# Patient Record
Sex: MALE | Race: WHITE | NOT HISPANIC OR LATINO | URBAN - METROPOLITAN AREA
[De-identification: names, ages, dates, MRNs, and addresses within clinical notes are randomized per-mention and may not be internally consistent; named-entity substitution may affect disease eponyms.]

---

## 2021-04-06 DIAGNOSIS — Z23 ENCOUNTER FOR IMMUNIZATION: ICD-10-CM

## 2021-04-09 ENCOUNTER — IMMUNIZATIONS (OUTPATIENT)
Dept: FAMILY MEDICINE CLINIC | Facility: HOSPITAL | Age: 31
End: 2021-04-09

## 2021-04-09 DIAGNOSIS — Z23 ENCOUNTER FOR IMMUNIZATION: ICD-10-CM

## 2022-05-04 ENCOUNTER — OFFICE VISIT (OUTPATIENT)
Dept: INTERNAL MEDICINE CLINIC | Facility: CLINIC | Age: 32
End: 2022-05-04
Payer: COMMERCIAL

## 2022-05-04 ENCOUNTER — TELEPHONE (OUTPATIENT)
Dept: PSYCHIATRY | Facility: CLINIC | Age: 32
End: 2022-05-04

## 2022-05-04 VITALS
HEART RATE: 55 BPM | DIASTOLIC BLOOD PRESSURE: 72 MMHG | WEIGHT: 200.6 LBS | HEIGHT: 69 IN | SYSTOLIC BLOOD PRESSURE: 112 MMHG | TEMPERATURE: 97 F | BODY MASS INDEX: 29.71 KG/M2 | OXYGEN SATURATION: 99 %

## 2022-05-04 DIAGNOSIS — Z30.2 ENCOUNTER FOR VASECTOMY: ICD-10-CM

## 2022-05-04 DIAGNOSIS — Z13.1 SCREENING FOR DIABETES MELLITUS: ICD-10-CM

## 2022-05-04 DIAGNOSIS — J30.9 ALLERGIC RHINITIS, UNSPECIFIED SEASONALITY, UNSPECIFIED TRIGGER: ICD-10-CM

## 2022-05-04 DIAGNOSIS — F34.1 DYSTHYMIA: Primary | ICD-10-CM

## 2022-05-04 DIAGNOSIS — R41.840 INATTENTION: ICD-10-CM

## 2022-05-04 DIAGNOSIS — Z76.89 ENCOUNTER TO ESTABLISH CARE: ICD-10-CM

## 2022-05-04 DIAGNOSIS — Z13.220 SCREENING FOR LIPID DISORDERS: ICD-10-CM

## 2022-05-04 DIAGNOSIS — Z30.09 VASECTOMY EVALUATION: ICD-10-CM

## 2022-05-04 DIAGNOSIS — F33.9 DEPRESSION, RECURRENT (HCC): ICD-10-CM

## 2022-05-04 DIAGNOSIS — Z13.0 SCREENING FOR DEFICIENCY ANEMIA: ICD-10-CM

## 2022-05-04 PROBLEM — M54.50 LOW BACK PAIN: Status: RESOLVED | Noted: 2022-05-04 | Resolved: 2022-05-04

## 2022-05-04 PROBLEM — F32.A DEPRESSION: Status: ACTIVE | Noted: 2022-05-04

## 2022-05-04 PROBLEM — R09.1 PLEURISY: Status: RESOLVED | Noted: 2022-05-04 | Resolved: 2022-05-04

## 2022-05-04 PROBLEM — R09.1 PLEURISY: Status: ACTIVE | Noted: 2022-05-04

## 2022-05-04 PROBLEM — M54.50 LOW BACK PAIN: Status: ACTIVE | Noted: 2022-05-04

## 2022-05-04 PROCEDURE — 3725F SCREEN DEPRESSION PERFORMED: CPT | Performed by: NURSE PRACTITIONER

## 2022-05-04 PROCEDURE — 99204 OFFICE O/P NEW MOD 45 MIN: CPT | Performed by: NURSE PRACTITIONER

## 2022-05-04 PROCEDURE — 3008F BODY MASS INDEX DOCD: CPT | Performed by: NURSE PRACTITIONER

## 2022-05-04 RX ORDER — BUPROPION HYDROCHLORIDE 150 MG/1
150 TABLET ORAL EVERY MORNING
Qty: 30 TABLET | Refills: 1 | Status: SHIPPED | OUTPATIENT
Start: 2022-05-04 | End: 2022-06-08

## 2022-05-04 RX ORDER — CETIRIZINE HYDROCHLORIDE 10 MG/1
10 TABLET ORAL DAILY
COMMUNITY
Start: 2019-07-06

## 2022-05-04 NOTE — PROGRESS NOTES
Assessment/Plan:    Problem List Items Addressed This Visit        Respiratory    Allergic rhinitis     - Claritin daily             Other    Depression, recurrent ([address] ) - Primary     - start wellbutrin XL 150mg daily   - follow up in 4 weeks          Relevant Medications    buPROPion (Wellbutrin XL) 150 mg 24 hr tablet    Inattention    Relevant Orders    TSH, 3rd generation with Free T4 reflex    Ambulatory Referral to Psychiatry    Vasectomy evaluation    Relevant Orders    Ambulatory Referral to Urology    RESOLVED: Encounter for vasectomy      Other Visit Diagnoses     Screening for lipid disorders        Relevant Orders    Lipid Panel with Direct LDL reflex    Screening for diabetes mellitus        Relevant Orders    Comprehensive metabolic panel    Screening for deficiency anemia        Relevant Orders    CBC and differential    Encounter to establish care        medical history reviewed with patient in detail         BMI Counseling: Body mass index is 29 44 kg/m²  Discussed the patient's BMI with him  The BMI is above normal  Nutrition recommendations include 3-5 servings of fruits/vegetables daily, moderation in carbohydrate intake, increasing intake of lean protein and reducing intake of saturated fat and trans fat  Exercise recommendations include moderate aerobic physical activity for 150 minutes/week  M*Modal software was used to dictate this note  It may contain errors with dictating incorrect words or incorrect spelling  Please contact the provider directly with any questions  Subjective:      Patient ID: Leelee Hudson is a 32 y o  male  HPI     Patient presents today to establish care with our office  He was previously following with a private practice Dr Faria Simple  Medical conditions include:     Depression - he was suspected to have depression approx 10 years ago  He did see a therapist for about 9 months prior to the pandemic  He has never been on medication   He has not seen his counselor in 2 years  He did find some benefit in seeing the counselor  PHQ-2/9 Depression Screening    Little interest or pleasure in doing things: 1 - several days  Feeling down, depressed, or hopeless: 1 - several days  Trouble falling or staying asleep, or sleeping too much: 0 - not at all  Feeling tired or having little energy: 3 - nearly every day  Poor appetite or overeatin - more than half the days  Feeling bad about yourself - or that you are a failure or have let yourself or your family down: 0 - not at all  Trouble concentrating on things, such as reading the newspaper or watching television: 2 - more than half the days  Moving or speaking so slowly that other people could have noticed  Or the opposite - being so fidgety or restless that you have been moving around a lot more than usual: 0 - not at all  Thoughts that you would be better off dead, or of hurting yourself in some way: 0 - not at all  PHQ-2 Score: 2  PHQ-2 Interpretation: Negative depression screen  PHQ-9 Score: 9   PHQ-9 Interpretation: Mild depression      Anxiety - he has some situational anxiety which he finds he deals with mostly through avoidance of the things that cause his symptoms  ADHD - he is concerned for possible ADHD  His son was recently diagnosed and he feels that his symptoms mimic the same as his sons  In school, he was able to get good grades but did not do his homework  He had trouble paying attention if he was bored  He works at a [employer] plant  He does well at his job  He is able to focus well and complete his tasks without issues  He has trouble paying attention to conversations       The following portions of the patient's history were reviewed and updated as appropriate: allergies, current medications, past family history, past medical history, past social history, past surgical history and problem list     Review of Systems   Constitutional: Positive for unexpected weight change (weight has fluctuated from 179lb, 215lbs now 200lbs  )  Negative for activity change, appetite change, chills, diaphoresis, fatigue and fever  Respiratory: Negative for cough, chest tightness, shortness of breath and wheezing  Cardiovascular: Negative for chest pain  Hx of intermittent chest tightness and pain a long time ago, unable to recall any events   Gastrointestinal: Negative for abdominal distention, abdominal pain, blood in stool, constipation, diarrhea, nausea and vomiting  Neurological: Positive for headaches (intermittent, controlled with IBUprofen)  Negative for dizziness and light-headedness  Psychiatric/Behavioral: Positive for decreased concentration and dysphoric mood  Negative for self-injury, sleep disturbance and suicidal ideas  The patient is nervous/anxious  Past Medical History:   Diagnosis Date    Depression          Current Outpatient Medications:     cetirizine (ZyrTEC Allergy) 10 mg tablet, Take 10 mg by mouth daily  , Disp: , Rfl:     buPROPion (Wellbutrin XL) 150 mg 24 hr tablet, Take 1 tablet (150 mg total) by mouth every morning, Disp: 30 tablet, Rfl: 1    Allergies   Allergen Reactions    Pollen Extract Other (See Comments)     Nasal congestion, itchy eyes       Social History   Past Surgical History:   Procedure Laterality Date    TOE SURGERY  04/2018    1st right toe due to infection     Family History   Problem Relation Age of Onset    Diabetes Mother     Anxiety disorder Mother         undiagnosed ADHD    Diabetes Father         retinopathy    Diabetes Maternal Grandfather     Lung cancer Paternal Grandmother         smoker       Objective:  /72 (BP Location: Left arm, Patient Position: Sitting, Cuff Size: Standard)   Pulse 55   Temp (!) 97 °F (36 1 °C) (Tympanic)   Ht 5' 9 21" (1 758 m)   Wt 91 kg (200 lb 9 6 oz)   SpO2 99%   BMI 29 44 kg/m²      Physical Exam  Vitals reviewed  Constitutional:       General: He is not in acute distress  Appearance: Normal appearance  He is well-developed  He is not diaphoretic  HENT:      Head: Normocephalic and atraumatic  Right Ear: Tympanic membrane and external ear normal       Left Ear: Tympanic membrane and external ear normal       Nose: Nose normal       Mouth/Throat:      Mouth: Mucous membranes are moist       Pharynx: Oropharynx is clear  No oropharyngeal exudate or posterior oropharyngeal erythema  Eyes:      Extraocular Movements: Extraocular movements intact  Conjunctiva/sclera: Conjunctivae normal       Pupils: Pupils are equal, round, and reactive to light  Neck:      Vascular: No carotid bruit  Cardiovascular:      Rate and Rhythm: Normal rate and regular rhythm  Heart sounds: Normal heart sounds  No murmur heard  Pulmonary:      Effort: Pulmonary effort is normal  No respiratory distress  Breath sounds: Normal breath sounds  No decreased breath sounds, wheezing, rhonchi or rales  Musculoskeletal:      Cervical back: Neck supple  Lymphadenopathy:      Cervical: No cervical adenopathy  Skin:     General: Skin is warm and dry  Neurological:      Mental Status: He is alert and oriented to person, place, and time  Mental status is at baseline  Motor: No weakness  Gait: Gait normal    Psychiatric:         Mood and Affect: Mood normal          Behavior: Behavior normal          Thought Content:  Thought content normal          Judgment: Judgment normal

## 2022-05-31 ENCOUNTER — TELEPHONE (OUTPATIENT)
Dept: PSYCHIATRY | Facility: CLINIC | Age: 32
End: 2022-05-31

## 2022-05-31 NOTE — TELEPHONE ENCOUNTER
Patient has a referral in system  Called and spoke with patient  Patient has been added to the wait list for med mgmt

## 2022-06-01 ENCOUNTER — RA CDI HCC (OUTPATIENT)
Dept: OTHER | Facility: HOSPITAL | Age: 32
End: 2022-06-01

## 2022-06-01 NOTE — PROGRESS NOTES
[facility]  coding opportunities       Chart reviewed, no opportunity found: CHART REVIEWED, NO OPPORTUNITY FOUND        Patients Insurance        Commercial Insurance: [employer]

## 2022-06-02 ENCOUNTER — TELEPHONE (OUTPATIENT)
Dept: PSYCHIATRY | Facility: CLINIC | Age: 32
End: 2022-06-02

## 2022-06-02 NOTE — TELEPHONE ENCOUNTER
Behavorial Health Outpatient Intake Questions    Referred by: PCP    Please advised interviewee that they need to answer all questions truthfully to allow for best care and any misrepresentations of information may affect their ability to be seen at this clinic   => Was this discussed? Yes     BehavGeneral acute hospital Health Outpatient Intake History -     Presenting Problem (in patient's words):   ADHA     Are there any developmental disabilities? ? If yes, can they speak to you on the phone? If they are too limited to speak to you on phone, refer out No    Are you taking any psychiatric medications? Yes    => If yes, who prescribes? If yes, are they injectable medications? adderall =PCP      Does the patient have a language barrier or hearing impairment? No    Have you been treated at [facility] by a therapist or a doctor in the past? If yes, who? No    Has the patient been hospitalized for mental health? No   If yes, how long ago was last hospitalization and where was it? Do you actively use alcohol or marijuana or illegal substances? If yes, what and how much - refer out to Drug and alcohol treatment if use is excessive or daily use of illegal substances No concerns of substance abuse are reported  Do you have a community treatment team or ? No    Legal History-     Does the patient have any history of arrests, correction/detention time, or DUIs? No  If Yes-  1) What types of charges? 2) When were they last incarcerated? 3) Are they currently on parole or probation? Minor Child-    Who has custody of the child? Is there a custody agreement? If there is a custody agreement remind parent that they must bring a copy to the first appt or they will not be seen       Intake Team, please check with provider before scheduling if flags come up such as:  - complex case  - legal history (other than DUI)  - communication barrier concerns are present  - if, in your judgment, this needs further review    ACCEPTED as a patient Yes  => Appointment Date: 06/10/2022 w/ Josefa     Referred Elsewhere? No    Name of Insurance Co:[address]#[id number]  Insurance Phone #  If ins is primary or secondary  If patient is a minor, parents information such as Name, D  O B of guarantor

## 2022-06-08 ENCOUNTER — OFFICE VISIT (OUTPATIENT)
Dept: INTERNAL MEDICINE CLINIC | Facility: CLINIC | Age: 32
End: 2022-06-08
Payer: COMMERCIAL

## 2022-06-08 VITALS
TEMPERATURE: 97.5 F | SYSTOLIC BLOOD PRESSURE: 140 MMHG | DIASTOLIC BLOOD PRESSURE: 90 MMHG | WEIGHT: 194 LBS | OXYGEN SATURATION: 98 % | BODY MASS INDEX: 24.9 KG/M2 | HEART RATE: 79 BPM | HEIGHT: 74 IN

## 2022-06-08 DIAGNOSIS — R74.8 ELEVATED LIVER ENZYMES: ICD-10-CM

## 2022-06-08 DIAGNOSIS — F90.2 ATTENTION DEFICIT HYPERACTIVITY DISORDER (ADHD), COMBINED TYPE: ICD-10-CM

## 2022-06-08 DIAGNOSIS — R73.01 IMPAIRED FASTING GLUCOSE: ICD-10-CM

## 2022-06-08 DIAGNOSIS — E83.52 HYPERCALCEMIA: Primary | ICD-10-CM

## 2022-06-08 DIAGNOSIS — F33.9 DEPRESSION, RECURRENT (HCC): ICD-10-CM

## 2022-06-08 DIAGNOSIS — R03.0 ELEVATED BP WITHOUT DIAGNOSIS OF HYPERTENSION: ICD-10-CM

## 2022-06-08 PROBLEM — R41.840 INATTENTION: Status: RESOLVED | Noted: 2022-05-04 | Resolved: 2022-06-08

## 2022-06-08 PROCEDURE — 93000 ELECTROCARDIOGRAM COMPLETE: CPT | Performed by: NURSE PRACTITIONER

## 2022-06-08 PROCEDURE — 99214 OFFICE O/P EST MOD 30 MIN: CPT | Performed by: NURSE PRACTITIONER

## 2022-06-08 RX ORDER — DEXTROAMPHETAMINE SACCHARATE, AMPHETAMINE ASPARTATE MONOHYDRATE, DEXTROAMPHETAMINE SULFATE AND AMPHETAMINE SULFATE 7.5; 7.5; 7.5; 7.5 MG/1; MG/1; MG/1; MG/1
30 CAPSULE, EXTENDED RELEASE ORAL DAILY
COMMUNITY
Start: 2022-06-02 | End: 2022-06-10 | Stop reason: SDUPTHER

## 2022-06-08 NOTE — PROGRESS NOTES
Assessment/Plan:    Problem List Items Addressed This Visit        Endocrine    Impaired fasting glucose    Relevant Orders    Comprehensive metabolic panel    Hemoglobin A1C       Other    Depression, recurrent (HCC)     - now following with psychiatry            Relevant Medications    amphetamine-dextroamphetamine (ADDERALL XR) 30 MG 24 hr capsule    Hypercalcemia - Primary     - repeat in 1 month  - check PTH            Relevant Orders    Comprehensive metabolic panel    PTH, intact    Elevated liver enzymes     - repeat in 1 month, no prior labs for comparison   - if level continue to elevated will check liver US            Relevant Orders    Comprehensive metabolic panel    Chronic Hepatitis Panel    Gamma GT    Elevated BP without diagnosis of hypertension     - suspect his elevated BP is secondary to his Adderall XR 30mg  He has no hx of HTN until starting this medication  - asymptomatic  - EKG sinus rhythm with PACs   - advised to follow up with psychiatrist to discuss alternative medication  - recommend monitoring BP at home            Relevant Orders    POCT ECG (Completed)    UA w Reflex to Microscopic w Reflex to Culture -Lab Collect    Attention deficit hyperactivity disorder (ADHD), combined type     - managed by psychiatry  - recently started on Adderall XR 30mg daily  - advised to follow up to discuss alternative options due to HTN on Adderall            Relevant Medications    amphetamine-dextroamphetamine (ADDERALL XR) 30 MG 24 hr capsule          M*Modal software was used to dictate this note  It may contain errors with dictating incorrect words or incorrect spelling  Please contact the provider directly with any questions  Subjective:      Patient ID: Jairo Kowalski is a 32 y o  male  HPI    Patient presents today for 1 month follow up  After our last visit he was able to see a virtual psychiatrist outside of [place]  He was evaluated and diagnosed with ADHD combined type   He was started on Adderall XR 20mg daily and then increased last week to Adderall XR 30mg daily  He did try the wellbutrin 150mg daily for 1 week and then was advised to stop by his psychiatrist      Since starting Adderall he is able to maintain and finish tasks that he does not enjoy doing  He is able to stay on track more easily  He has more iniative, he is more proactive and attentive around his kids and family  Labs completed 6/2  CBC normal  Elevated ,   elevated AST 48, elevated ALT 90  Elevated calcium 10 5    Total cholesterol 182, triglycerides 167, HDL 37,       The following portions of the patient's history were reviewed and updated as appropriate: allergies, current medications, past family history, past medical history, past social history, past surgical history and problem list     Review of Systems   Constitutional: Negative for appetite change, chills, fever and unexpected weight change  Respiratory: Negative for cough, chest tightness, shortness of breath and wheezing  Cardiovascular: Negative for chest pain and palpitations  Neurological: Negative for headaches  Psychiatric/Behavioral: Positive for decreased concentration and dysphoric mood           Past Medical History:   Diagnosis Date    Depression          Current Outpatient Medications:     amphetamine-dextroamphetamine (ADDERALL XR) 30 MG 24 hr capsule, Take 30 mg by mouth daily, Disp: , Rfl:     cetirizine (ZyrTEC) 10 mg tablet, Take 10 mg by mouth daily  , Disp: , Rfl:     Allergies   Allergen Reactions    Pollen Extract Other (See Comments)     Nasal congestion, itchy eyes       Social History   Past Surgical History:   Procedure Laterality Date    TOE SURGERY  04/2018    1st right toe due to infection     Family History   Problem Relation Age of Onset    Diabetes Mother     Anxiety disorder Mother         undiagnosed ADHD    Diabetes Father         retinopathy    Diabetes Maternal Grandfather     Lung cancer Paternal Grandmother         smoker       Objective:  /90 (BP Location: Left arm, Patient Position: Sitting, Cuff Size: Standard)   Pulse 79   Temp 97 5 °F (36 4 °C) (Temporal)   Ht 6' 2 02" (1 88 m)   Wt 88 kg (194 lb)   SpO2 98%   BMI 24 90 kg/m²      Physical Exam  Vitals reviewed  Constitutional:       General: He is not in acute distress  Appearance: Normal appearance  He is not diaphoretic  HENT:      Head: Normocephalic and atraumatic  Eyes:      Extraocular Movements: Extraocular movements intact  Conjunctiva/sclera: Conjunctivae normal       Pupils: Pupils are equal, round, and reactive to light  Cardiovascular:      Rate and Rhythm: Normal rate and regular rhythm  Heart sounds: Normal heart sounds  No murmur heard  Pulmonary:      Effort: Pulmonary effort is normal  No respiratory distress  Breath sounds: Normal breath sounds  No wheezing, rhonchi or rales  Neurological:      Mental Status: He is alert and oriented to person, place, and time  Mental status is at baseline     Psychiatric:         Mood and Affect: Mood normal          Behavior: Behavior normal

## 2022-06-08 NOTE — ASSESSMENT & PLAN NOTE
- managed by psychiatry  - recently started on Adderall XR 30mg daily  - advised to follow up to discuss alternative options due to HTN on Adderall

## 2022-06-08 NOTE — ASSESSMENT & PLAN NOTE
- suspect his elevated BP is secondary to his Adderall XR 30mg   He has no hx of HTN until starting this medication  - asymptomatic  - EKG sinus rhythm with PACs   - advised to follow up with psychiatrist to discuss alternative medication  - recommend monitoring BP at home

## 2022-06-08 NOTE — ASSESSMENT & PLAN NOTE
- repeat in 1 month, no prior labs for comparison   - if level continue to elevated will check liver US

## 2022-06-10 ENCOUNTER — OFFICE VISIT (OUTPATIENT)
Dept: PSYCHIATRY | Facility: CLINIC | Age: 32
End: 2022-06-10
Payer: COMMERCIAL

## 2022-06-10 VITALS — WEIGHT: 191.3 LBS | BODY MASS INDEX: 28.33 KG/M2 | HEIGHT: 69 IN

## 2022-06-10 DIAGNOSIS — F34.1 DYSTHYMIA: ICD-10-CM

## 2022-06-10 DIAGNOSIS — F41.0 PANIC ATTACKS: ICD-10-CM

## 2022-06-10 DIAGNOSIS — F90.2 ATTENTION DEFICIT HYPERACTIVITY DISORDER (ADHD), COMBINED TYPE: Primary | ICD-10-CM

## 2022-06-10 DIAGNOSIS — R41.840 INATTENTION: ICD-10-CM

## 2022-06-10 DIAGNOSIS — F32.1 MODERATE MAJOR DEPRESSION (HCC): ICD-10-CM

## 2022-06-10 DIAGNOSIS — F41.1 GENERALIZED ANXIETY DISORDER: ICD-10-CM

## 2022-06-10 PROCEDURE — 3008F BODY MASS INDEX DOCD: CPT | Performed by: NURSE PRACTITIONER

## 2022-06-10 PROCEDURE — 90792 PSYCH DIAG EVAL W/MED SRVCS: CPT | Performed by: PHYSICIAN ASSISTANT

## 2022-06-10 RX ORDER — DEXTROAMPHETAMINE SACCHARATE, AMPHETAMINE ASPARTATE MONOHYDRATE, DEXTROAMPHETAMINE SULFATE AND AMPHETAMINE SULFATE 7.5; 7.5; 7.5; 7.5 MG/1; MG/1; MG/1; MG/1
30 CAPSULE, EXTENDED RELEASE ORAL DAILY
Qty: 30 CAPSULE | Refills: 0 | Status: SHIPPED | OUTPATIENT
Start: 2022-06-10 | End: 2022-07-08 | Stop reason: SDUPTHER

## 2022-06-10 NOTE — BH TREATMENT PLAN
TREATMENT PLAN (Medication Management Only)        [facility]    Name/Date of Birth/MRN:  Leelee Hudson 31 y o  1990 MRN: [id number]  Date of Treatment Plan: Nereyda 10, 2022  Diagnosis/Diagnoses:   1  Attention deficit hyperactivity disorder (ADHD), combined type    2  Generalized anxiety disorder    3  Moderate major depression ([address] )    4  Dysthymia    5  Panic attacks    6  Inattention      Strengths/Personal Resources for Self-Care: "Being good at finding the details; At times, I have impressive recollection for certain facts and trivia "  Area/Areas of need (in own words): "Largely, somebody else confirming that --yes, you have this going on" and to have a psychiatric professional outpatient for steady f/u  "   1  Long Term Goal: "Maintain mood stability and control ADHD and anxiety "  Target Date: 3-6 months  Person/Persons responsible for completion of goal: Lenora Martin  2  Short Term Objective (s) - How will we reach this goal?:   A  Provider new recommended medication/dosage changes and/or continue medication(s): Pt is having Sxs indicative of ADHD, combined type, Moderate Major Depression, and Generalized Anxiety  Surveys done 6/9/2022 via [employer]: Mdq score 12 --(but really explained by the ADHD), and the PHQ 9 score finished out today 11  Working Dxs are listed above  Tx options discussed and Pt accepts to start Sertraline for mood and anxiety Sxs  Will continue the stimulant at this time as started by the online provider (Pt has no current outpatient psychiatrist) but will track BP and LFTs which were recently elevated  Also advised against any ETOH use (he is currently drinking 1-2 hard ciders once a week  Treatment plan done and Pt accepts the plan     Start Sertraline 50mg (1/2) tab po qd x 2 weeks then (1) tab qd # 30  Continue from prior psychiatrist's Rx:  Amphetamine-Dextro ER 30mg (1) tab po qd # 30 --was given Qty of 15 by last provider on 6/2/2022  Pt to have CMP, Chronic Hepatitis Panel, PTH, HgbA1C, Gamma GT and UA  Get UDS   Return 7/8/2022, make another appt for 8/4/2022  Can call any time sooner prn  Pt made aware of the 24-7 after hours call line    Target Date: 3-6 months  Person/Persons Responsible for Completion of Goal: Nigel Mac and Winnie Pelletier   Progress Towards Goals: stable, continuing treatment  Treatment Modality: Medication mgt and referred to psychotherapy  Review due 90 to 120 days from date of this plan: 6 months - 12/10/2022  Expected length of service: ongoing treatment unless revised  My Physician/PA/NP and I have developed this plan together and I agree to work on the goals and objectives  I understand the treatment goals that were developed for my treatment    Signature:       Date and time:  Signature of parent/guardian if under age of 15 years: Date and time:  Signature of provider:      Date and time:  Signature of Supervising Physician:    Date and time: 6/10/2022      Yuliana Davis PA-C
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
## 2022-07-07 LAB
HBA1C MFR BLD HPLC: 6.4 %
HCV AB SER-ACNC: NEGATIVE

## 2022-07-08 ENCOUNTER — OFFICE VISIT (OUTPATIENT)
Dept: PSYCHIATRY | Facility: CLINIC | Age: 32
End: 2022-07-08
Payer: COMMERCIAL

## 2022-07-08 DIAGNOSIS — F41.0 PANIC ATTACKS: ICD-10-CM

## 2022-07-08 DIAGNOSIS — F41.1 GENERALIZED ANXIETY DISORDER: Primary | ICD-10-CM

## 2022-07-08 DIAGNOSIS — F90.2 ATTENTION DEFICIT HYPERACTIVITY DISORDER (ADHD), COMBINED TYPE: ICD-10-CM

## 2022-07-08 DIAGNOSIS — F34.1 DYSTHYMIA: ICD-10-CM

## 2022-07-08 DIAGNOSIS — F32.1 MODERATE MAJOR DEPRESSION (HCC): ICD-10-CM

## 2022-07-08 PROCEDURE — 3725F SCREEN DEPRESSION PERFORMED: CPT | Performed by: PHYSICIAN ASSISTANT

## 2022-07-08 PROCEDURE — 99213 OFFICE O/P EST LOW 20 MIN: CPT | Performed by: PHYSICIAN ASSISTANT

## 2022-07-08 RX ORDER — SERTRALINE HYDROCHLORIDE 25 MG/1
25 TABLET, FILM COATED ORAL DAILY
Qty: 30 TABLET | Refills: 0 | Status: SHIPPED | OUTPATIENT
Start: 2022-07-08 | End: 2022-08-03 | Stop reason: SDUPTHER

## 2022-07-08 RX ORDER — DEXTROAMPHETAMINE SACCHARATE, AMPHETAMINE ASPARTATE MONOHYDRATE, DEXTROAMPHETAMINE SULFATE AND AMPHETAMINE SULFATE 7.5; 7.5; 7.5; 7.5 MG/1; MG/1; MG/1; MG/1
30 CAPSULE, EXTENDED RELEASE ORAL DAILY
Qty: 30 CAPSULE | Refills: 0 | Status: SHIPPED | OUTPATIENT
Start: 2022-07-08 | End: 2022-08-03 | Stop reason: SDUPTHER

## 2022-07-20 ENCOUNTER — OFFICE VISIT (OUTPATIENT)
Dept: INTERNAL MEDICINE CLINIC | Facility: CLINIC | Age: 32
End: 2022-07-20
Payer: COMMERCIAL

## 2022-07-20 VITALS
WEIGHT: 189.2 LBS | SYSTOLIC BLOOD PRESSURE: 110 MMHG | HEART RATE: 112 BPM | OXYGEN SATURATION: 98 % | DIASTOLIC BLOOD PRESSURE: 60 MMHG | BODY MASS INDEX: 27.09 KG/M2 | HEIGHT: 70 IN | TEMPERATURE: 98.4 F

## 2022-07-20 DIAGNOSIS — R73.03 PREDIABETES: ICD-10-CM

## 2022-07-20 DIAGNOSIS — F90.2 ATTENTION DEFICIT HYPERACTIVITY DISORDER (ADHD), COMBINED TYPE: ICD-10-CM

## 2022-07-20 DIAGNOSIS — R74.8 ELEVATED LIVER ENZYMES: Primary | ICD-10-CM

## 2022-07-20 PROBLEM — R73.01 IMPAIRED FASTING GLUCOSE: Status: RESOLVED | Noted: 2022-06-08 | Resolved: 2022-07-20

## 2022-07-20 PROBLEM — E83.52 HYPERCALCEMIA: Status: RESOLVED | Noted: 2022-06-08 | Resolved: 2022-07-20

## 2022-07-20 PROCEDURE — 99214 OFFICE O/P EST MOD 30 MIN: CPT | Performed by: NURSE PRACTITIONER

## 2022-07-20 NOTE — ASSESSMENT & PLAN NOTE
- HB A1c 6 4  - discussed importance of lifestyle modifications including dietary changes, low carb, whole grain, high-fiber, lean protein, high in vegetables  Avoid white bread, rice and pasta  Avoid any sweetened drinks  - exercise recommended 30 minutes 5 times a week  - discussed metformin  Patient would like to start this medication  Will start 850 mg once daily    Plan to repeat labs in 6 months

## 2022-07-20 NOTE — ASSESSMENT & PLAN NOTE
- improving    ALT remains slightly elevated at 64 in GGT slightly elevated at 89  - check liver ultrasound  - repeat labs in 3 months

## 2022-07-20 NOTE — PROGRESS NOTES
Assessment/Plan:    Problem List Items Addressed This Visit        Other    Elevated liver enzymes - Primary     - improving  ALT remains slightly elevated at 64 in GGT slightly elevated at 89  - check liver ultrasound  - repeat labs in 3 months         Relevant Orders    US right upper quadrant    Comprehensive metabolic panel    Attention deficit hyperactivity disorder (ADHD), combined type     - managed by Psychiatry on Adderall XR 30 mg daily         Prediabetes     - HB A1c 6 4  - discussed importance of lifestyle modifications including dietary changes, low carb, whole grain, high-fiber, lean protein, high in vegetables  Avoid white bread, rice and pasta  Avoid any sweetened drinks  - exercise recommended 30 minutes 5 times a week  - discussed metformin  Patient would like to start this medication  Will start 850 mg once daily  Plan to repeat labs in 6 months         Relevant Medications    metFORMIN (GLUCOPHAGE) 850 mg tablet    Other Relevant Orders    Comprehensive metabolic panel          BMI Counseling: Body mass index is 26 97 kg/m²  Discussed the patient's BMI with him  The BMI is above normal  Nutrition recommendations include 3-5 servings of fruits/vegetables daily, moderation in carbohydrate intake and increasing intake of lean protein  Exercise recommendations include moderate aerobic physical activity for 150 minutes/week  M*Modal software was used to dictate this note  It may contain errors with dictating incorrect words or incorrect spelling  Please contact the provider directly with any questions  Subjective:      Patient ID: Alverto Kirk is a 28 y o  male  HPI    Patient presents today for 1 month follow up  He is following with psychiatry and he continues on Adderall XR 30mg daily and he is on sertraline 75mg once daily  He feels his ADHD is very well controlled  He reports his anxiety and depression are also well controlled   He notes his anxiety has significantly improved since increasing sertraline from 50mg to 75mg  Labs completed 7/7    Pre-diabetes     HbA1c 6 4     Elevated liver enzymes  GGT 89  AST 28, previously 48  ALT 64, previously 90  Alk phos 93, previously 78    The following portions of the patient's history were reviewed and updated as appropriate: allergies, current medications, past family history, past medical history, past social history, past surgical history and problem list     Review of Systems   Constitutional: Negative for chills, fever and unexpected weight change  Respiratory: Negative for chest tightness  Psychiatric/Behavioral: Negative for dysphoric mood  The patient is not nervous/anxious            Past Medical History:   Diagnosis Date    Depression          Current Outpatient Medications:     amphetamine-dextroamphetamine (ADDERALL XR) 30 MG 24 hr capsule, Take 1 capsule (30 mg total) by mouth daily For ongoing treatment Max Daily Amount: 30 mg, Disp: 30 capsule, Rfl: 0    cetirizine (ZyrTEC) 10 mg tablet, Take 10 mg by mouth daily  , Disp: , Rfl:     metFORMIN (GLUCOPHAGE) 850 mg tablet, Take 1 tablet (850 mg total) by mouth daily with breakfast, Disp: 30 tablet, Rfl: 5    sertraline (ZOLOFT) 25 mg tablet, Take 1 tablet (25 mg total) by mouth daily Take with the 50mg tab for a total of 75mg daily, Disp: 30 tablet, Rfl: 0    sertraline (Zoloft) 50 mg tablet, Take 1 tablet (50 mg total) by mouth daily, Disp: 30 tablet, Rfl: 0    Allergies   Allergen Reactions    Pollen Extract Other (See Comments)     Nasal congestion, itchy eyes       Social History   Past Surgical History:   Procedure Laterality Date    TOE SURGERY  04/2018    1st right toe due to infection     Family History   Problem Relation Age of Onset    Diabetes Mother     Anxiety disorder Mother         undiagnosed ADHD    Diabetes Father         retinopathy    Depression Sister     Depression Brother     Anxiety disorder Brother     Autism spectrum disorder Brother     Diabetes Maternal Grandfather     Lung cancer Paternal Grandmother         smoker    Alcohol abuse Paternal Grandfather     Alcohol abuse Maternal Uncle        Objective:  /60 (BP Location: Left arm, Patient Position: Sitting, Cuff Size: Standard)   Pulse (!) 112   Temp 98 4 °F (36 9 °C) (Temporal)   Ht 5' 10 24" (1 784 m)   Wt 85 8 kg (189 lb 3 2 oz)   SpO2 98% Comment: room air  BMI 26 97 kg/m²      Physical Exam  Vitals reviewed  Constitutional:       General: He is not in acute distress  Appearance: Normal appearance  He is not diaphoretic  HENT:      Head: Normocephalic and atraumatic  Eyes:      Extraocular Movements: Extraocular movements intact  Conjunctiva/sclera: Conjunctivae normal       Pupils: Pupils are equal, round, and reactive to light  Cardiovascular:      Rate and Rhythm: Normal rate and regular rhythm  Heart sounds: Normal heart sounds  No murmur heard  Pulmonary:      Effort: Pulmonary effort is normal  No respiratory distress  Breath sounds: Normal breath sounds  No wheezing, rhonchi or rales  Neurological:      Mental Status: He is alert and oriented to person, place, and time  Mental status is at baseline     Psychiatric:         Mood and Affect: Mood normal          Behavior: Behavior normal
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
## 2022-08-01 ENCOUNTER — TELEPHONE (OUTPATIENT)
Dept: PSYCHIATRY | Facility: CLINIC | Age: 32
End: 2022-08-01

## 2022-08-01 NOTE — TELEPHONE ENCOUNTER
Pt called to check on the status of a urinalysis script that was requested by provider  Pt stated they were told to have a urinalysis before 8/3 appt  Pt says that the lab, nor themselves has the script  Please review  Thank you

## 2022-08-01 NOTE — TELEPHONE ENCOUNTER
Writer informed pt that toxicology screen was requested from provider  Writer offered to print and mail to pt  Pt stated that they will be in office to  printed toxicology screen orders on 8/2/22

## 2022-08-02 ENCOUNTER — APPOINTMENT (OUTPATIENT)
Dept: LAB | Age: 32
End: 2022-08-02
Payer: COMMERCIAL

## 2022-08-02 PROCEDURE — 80307 DRUG TEST PRSMV CHEM ANLYZR: CPT | Performed by: PHYSICIAN ASSISTANT

## 2022-08-03 ENCOUNTER — OFFICE VISIT (OUTPATIENT)
Dept: PSYCHIATRY | Facility: CLINIC | Age: 32
End: 2022-08-03
Payer: COMMERCIAL

## 2022-08-03 VITALS — HEIGHT: 69 IN | WEIGHT: 186.6 LBS | BODY MASS INDEX: 27.64 KG/M2

## 2022-08-03 DIAGNOSIS — F32.1 MODERATE MAJOR DEPRESSION (HCC): ICD-10-CM

## 2022-08-03 DIAGNOSIS — R74.8 ELEVATED LIVER ENZYMES: ICD-10-CM

## 2022-08-03 DIAGNOSIS — F34.1 DYSTHYMIA: ICD-10-CM

## 2022-08-03 DIAGNOSIS — F41.1 GENERALIZED ANXIETY DISORDER: Primary | ICD-10-CM

## 2022-08-03 DIAGNOSIS — F90.2 ATTENTION DEFICIT HYPERACTIVITY DISORDER (ADHD), COMBINED TYPE: ICD-10-CM

## 2022-08-03 DIAGNOSIS — F41.0 PANIC ATTACKS: ICD-10-CM

## 2022-08-03 PROCEDURE — 3725F SCREEN DEPRESSION PERFORMED: CPT | Performed by: PHYSICIAN ASSISTANT

## 2022-08-03 PROCEDURE — 99213 OFFICE O/P EST LOW 20 MIN: CPT | Performed by: PHYSICIAN ASSISTANT

## 2022-08-03 RX ORDER — SERTRALINE HYDROCHLORIDE 25 MG/1
25 TABLET, FILM COATED ORAL DAILY
Qty: 30 TABLET | Refills: 2 | Status: SHIPPED | OUTPATIENT
Start: 2022-08-03 | End: 2022-10-26 | Stop reason: SDUPTHER

## 2022-08-03 RX ORDER — DEXTROAMPHETAMINE SACCHARATE, AMPHETAMINE ASPARTATE MONOHYDRATE, DEXTROAMPHETAMINE SULFATE AND AMPHETAMINE SULFATE 7.5; 7.5; 7.5; 7.5 MG/1; MG/1; MG/1; MG/1
30 CAPSULE, EXTENDED RELEASE ORAL EVERY MORNING
Qty: 30 CAPSULE | Refills: 0 | Status: SHIPPED | OUTPATIENT
Start: 2022-08-03 | End: 2022-10-26 | Stop reason: SDUPTHER

## 2022-08-03 RX ORDER — DEXTROAMPHETAMINE SACCHARATE, AMPHETAMINE ASPARTATE MONOHYDRATE, DEXTROAMPHETAMINE SULFATE AND AMPHETAMINE SULFATE 7.5; 7.5; 7.5; 7.5 MG/1; MG/1; MG/1; MG/1
30 CAPSULE, EXTENDED RELEASE ORAL DAILY
Qty: 30 CAPSULE | Refills: 0 | Status: SHIPPED | OUTPATIENT
Start: 2022-08-03 | End: 2022-10-26 | Stop reason: SDUPTHER

## 2022-08-04 LAB
AMPHETAMINES UR QL SCN: NEGATIVE NG/ML
BARBITURATES UR QL SCN: NEGATIVE NG/ML
BENZODIAZ UR QL: NEGATIVE NG/ML
BZE UR QL: NEGATIVE NG/ML
CANNABINOIDS UR QL SCN: NEGATIVE NG/ML
METHADONE UR QL SCN: NEGATIVE NG/ML
OPIATES UR QL: NEGATIVE NG/ML
PCP UR QL: NEGATIVE NG/ML
PROPOXYPH UR QL SCN: NEGATIVE NG/ML

## 2022-08-17 DIAGNOSIS — F90.2 ATTENTION DEFICIT HYPERACTIVITY DISORDER (ADHD), COMBINED TYPE: ICD-10-CM

## 2022-08-17 NOTE — TELEPHONE ENCOUNTER
3 prescriptions for amphet-dextroamphet 30 mg 24 hr cap sent 8/3/22  Spoke with the pharmacist at [facility] to process a prescription and will notify Ashli Marquez when it is ready  Spoke with Ashli Marquez and reviewed above  Also gave him the nursing number to call if he needs assistance with refills in the coming months

## 2022-08-22 RX ORDER — DEXTROAMPHETAMINE SACCHARATE, AMPHETAMINE ASPARTATE MONOHYDRATE, DEXTROAMPHETAMINE SULFATE AND AMPHETAMINE SULFATE 7.5; 7.5; 7.5; 7.5 MG/1; MG/1; MG/1; MG/1
30 CAPSULE, EXTENDED RELEASE ORAL EVERY MORNING
Qty: 30 CAPSULE | Refills: 0 | OUTPATIENT
Start: 2022-08-22

## 2022-10-17 ENCOUNTER — TELEPHONE (OUTPATIENT)
Dept: PSYCHIATRY | Facility: CLINIC | Age: 32
End: 2022-10-17

## 2022-10-17 DIAGNOSIS — F90.2 ATTENTION DEFICIT HYPERACTIVITY DISORDER (ADHD), COMBINED TYPE: Primary | ICD-10-CM

## 2022-10-17 NOTE — TELEPHONE ENCOUNTER
Request for refill via "Patient Medication Renewal Request Pool" notes [facility] and [facility] in the message  Called Ashli Marquez and left a VM requesting clarification of which pharmacy the prescription should go to  [facility] number given

## 2022-10-19 RX ORDER — DEXTROAMPHETAMINE SULFATE, DEXTROAMPHETAMINE SACCHARATE, AMPHETAMINE SULFATE AND AMPHETAMINE ASPARTATE 7.5; 7.5; 7.5; 7.5 MG/1; MG/1; MG/1; MG/1
30 CAPSULE, EXTENDED RELEASE ORAL EVERY MORNING
Qty: 30 CAPSULE | Refills: 0 | Status: SHIPPED | OUTPATIENT
Start: 2022-10-19 | End: 2022-10-21 | Stop reason: SDUPTHER

## 2022-10-19 NOTE — TELEPHONE ENCOUNTER
Follow up call made and spoke with Jaja Garrett  He is switching pharmacies to [facility]  He did not want [facility] removed from his pharmacy list at this time  Please send amphet-dextroamphet 30 mg 24 hr cap to [facility]

## 2022-10-19 NOTE — TELEPHONE ENCOUNTER
PDMP reviewed and is in agreement with Pt's msg    I e-scribed the following Rx for BRAND formulation to [facility], given another part of the msg string where [facility] mentioned he did not think his insurance would pay for the generic:    BRAND Adderall XR 30mg (1) cap po qd # 30
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
## 2022-10-21 ENCOUNTER — TELEPHONE (OUTPATIENT)
Dept: PSYCHIATRY | Facility: CLINIC | Age: 32
End: 2022-10-21

## 2022-10-21 DIAGNOSIS — F90.2 ATTENTION DEFICIT HYPERACTIVITY DISORDER (ADHD), COMBINED TYPE: ICD-10-CM

## 2022-10-21 RX ORDER — DEXTROAMPHETAMINE SULFATE, DEXTROAMPHETAMINE SACCHARATE, AMPHETAMINE SULFATE AND AMPHETAMINE ASPARTATE 7.5; 7.5; 7.5; 7.5 MG/1; MG/1; MG/1; MG/1
30 CAPSULE, EXTENDED RELEASE ORAL EVERY MORNING
Qty: 30 CAPSULE | Refills: 0 | Status: SHIPPED | OUTPATIENT
Start: 2022-10-21

## 2022-10-21 NOTE — TELEPHONE ENCOUNTER
[facility] pharmacy called and lm  The Adderall 30 mg XR is out of stock  Pharmacist called around an [address] [facility] has in [address]  Patient requesting this be sent to [facility] (added to preferred pharmacies)     The pharmacist at [facility] will discontinue the prescription

## 2022-10-21 NOTE — TELEPHONE ENCOUNTER
Msg came to me that [facility] was out of [facility]ant so they discarded the Rx I sent 10/19/2022  PDMP reviewed and last stimulant Rx was filled in 8/2022    I e-scribed the following to [facility] alternate-- [facility]:  Brand Adderall XR 30mg (1) cap po qAM # 30

## 2022-10-26 ENCOUNTER — TELEPHONE (OUTPATIENT)
Dept: PSYCHIATRY | Facility: CLINIC | Age: 32
End: 2022-10-26

## 2022-10-26 ENCOUNTER — OFFICE VISIT (OUTPATIENT)
Dept: PSYCHIATRY | Facility: CLINIC | Age: 32
End: 2022-10-26
Payer: COMMERCIAL

## 2022-10-26 DIAGNOSIS — F90.2 ATTENTION DEFICIT HYPERACTIVITY DISORDER (ADHD), COMBINED TYPE: ICD-10-CM

## 2022-10-26 DIAGNOSIS — F41.0 PANIC ATTACKS: ICD-10-CM

## 2022-10-26 DIAGNOSIS — F32.1 MODERATE MAJOR DEPRESSION (HCC): Primary | ICD-10-CM

## 2022-10-26 DIAGNOSIS — F41.1 GENERALIZED ANXIETY DISORDER: ICD-10-CM

## 2022-10-26 PROCEDURE — 99213 OFFICE O/P EST LOW 20 MIN: CPT | Performed by: PHYSICIAN ASSISTANT

## 2022-10-26 RX ORDER — DEXTROAMPHETAMINE SACCHARATE, AMPHETAMINE ASPARTATE MONOHYDRATE, DEXTROAMPHETAMINE SULFATE AND AMPHETAMINE SULFATE 7.5; 7.5; 7.5; 7.5 MG/1; MG/1; MG/1; MG/1
30 CAPSULE, EXTENDED RELEASE ORAL EVERY MORNING
Qty: 30 CAPSULE | Refills: 0 | Status: SHIPPED | OUTPATIENT
Start: 2022-10-26

## 2022-10-26 RX ORDER — SERTRALINE HYDROCHLORIDE 25 MG/1
25 TABLET, FILM COATED ORAL DAILY
Qty: 90 TABLET | Refills: 0 | Status: SHIPPED | OUTPATIENT
Start: 2022-10-26 | End: 2022-11-25

## 2022-10-26 RX ORDER — DEXTROAMPHETAMINE SACCHARATE, AMPHETAMINE ASPARTATE MONOHYDRATE, DEXTROAMPHETAMINE SULFATE AND AMPHETAMINE SULFATE 7.5; 7.5; 7.5; 7.5 MG/1; MG/1; MG/1; MG/1
30 CAPSULE, EXTENDED RELEASE ORAL DAILY
Qty: 30 CAPSULE | Refills: 0 | Status: SHIPPED | OUTPATIENT
Start: 2022-10-26

## 2022-10-26 NOTE — BH TREATMENT PLAN
TREATMENT PLAN (Medication Management Only)        [facility]    Name/Date of Birth/MRN:  Concetta Michele 32 y o  1990 MRN: [id number]  Date of Treatment Plan: October 26, 2022  Diagnosis/Diagnoses:   1  Moderate major depression ([address] )    2  Generalized anxiety disorder    3  Panic attacks    4  Attention deficit hyperactivity disorder (ADHD), combined type      Strengths/Personal Resources for Self-Care: "Finding details when looking for issues; Following procedures as they're written, making sure things are done correctly"  Area/Areas of need (in own words): "My wife's one complaint is libido "  1  Long Term Goal:   Maintain mood stability, control of anxiety and ADHD  Target Date: 3-6 months  Person/Persons responsible for completion of goal: Zaida Marie  2  Short Term Objective (s) - How will we reach this goal?:   A  Provider new recommended medication/dosage changes and/or continue medication(s): continue current medications as prescribed  Sertraline and Amphetamine-Dextro ER  Target Date: 3-6 months  Person/Persons Responsible for Completion of Goal: Zaida Spears 63   Progress Towards Goals: stable, continuing treatment  Treatment Modality: Medication mgt  Review due 180 days from date of this plan: 6 months - 4/26/2023  Expected length of service: ongoing treatment unless revised  My Physician/PA/NP and I have developed this plan together and I agree to work on the goals and objectives  I understand the treatment goals that were developed for my treatment    Electronic Signatures: on file (unless signed below)    Isaac Gilford, PA-C 10/26/22

## 2022-11-22 ENCOUNTER — TELEPHONE (OUTPATIENT)
Dept: OTHER | Facility: OTHER | Age: 32
End: 2022-11-22

## 2022-11-22 DIAGNOSIS — F90.2 ATTENTION DEFICIT HYPERACTIVITY DISORDER (ADHD), COMBINED TYPE: ICD-10-CM

## 2022-11-22 NOTE — TELEPHONE ENCOUNTER
Patient is requesting a call back from the office regarding ADDERALL XR, 30MG, 30 MG 24 hr capsule  Patient is stating that [facility] not [facility] for over a month and would like medication re sent to [facility]   Patient has not had his medication in over a month

## 2022-11-23 RX ORDER — DEXTROAMPHETAMINE SACCHARATE, AMPHETAMINE ASPARTATE MONOHYDRATE, DEXTROAMPHETAMINE SULFATE AND AMPHETAMINE SULFATE 7.5; 7.5; 7.5; 7.5 MG/1; MG/1; MG/1; MG/1
30 CAPSULE, EXTENDED RELEASE ORAL DAILY
Qty: 30 CAPSULE | Refills: 0 | OUTPATIENT
Start: 2022-11-23

## 2022-11-23 RX ORDER — DEXTROAMPHETAMINE SULFATE, DEXTROAMPHETAMINE SACCHARATE, AMPHETAMINE SULFATE AND AMPHETAMINE ASPARTATE 7.5; 7.5; 7.5; 7.5 MG/1; MG/1; MG/1; MG/1
30 CAPSULE, EXTENDED RELEASE ORAL EVERY MORNING
Qty: 30 CAPSULE | Refills: 0 | Status: SHIPPED | OUTPATIENT
Start: 2022-11-23

## 2022-11-23 NOTE — TELEPHONE ENCOUNTER
[facility] does not have the Adderall XR available and Pt is asking for new Rxs to be sent to [facility] instead  PDMP reviewed and request appears valid  I called Siva Dougherty on his cell/home phone # of record and left msg that I am sending a Rx for BRAND stimulant    He returns for f/u 1/18/2023 and I e-scribed the following to [facility]:  Adderall XR 30mg (1) tab po qd # 30 + 30

## 2023-01-06 DIAGNOSIS — R73.03 PREDIABETES: ICD-10-CM
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
## 2023-01-18 ENCOUNTER — OFFICE VISIT (OUTPATIENT)
Dept: PSYCHIATRY | Facility: CLINIC | Age: 33
End: 2023-01-18

## 2023-01-18 DIAGNOSIS — F41.0 PANIC ATTACKS: ICD-10-CM

## 2023-01-18 DIAGNOSIS — F90.2 ATTENTION DEFICIT HYPERACTIVITY DISORDER (ADHD), COMBINED TYPE: ICD-10-CM

## 2023-01-18 DIAGNOSIS — F41.1 GENERALIZED ANXIETY DISORDER: Primary | ICD-10-CM

## 2023-01-18 DIAGNOSIS — F32.1 MODERATE MAJOR DEPRESSION (HCC): ICD-10-CM

## 2023-01-18 RX ORDER — SERTRALINE HYDROCHLORIDE 25 MG/1
25 TABLET, FILM COATED ORAL DAILY
Qty: 90 TABLET | Refills: 0 | Status: SHIPPED | OUTPATIENT
Start: 2023-01-18 | End: 2023-02-17
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
## 2023-02-17 ENCOUNTER — OFFICE VISIT (OUTPATIENT)
Dept: PSYCHIATRY | Facility: CLINIC | Age: 33
End: 2023-02-17

## 2023-02-17 VITALS — BODY MASS INDEX: 29.33 KG/M2 | WEIGHT: 198 LBS | HEIGHT: 69 IN

## 2023-02-17 DIAGNOSIS — F41.0 PANIC ATTACKS: ICD-10-CM

## 2023-02-17 DIAGNOSIS — F90.2 ATTENTION DEFICIT HYPERACTIVITY DISORDER (ADHD), COMBINED TYPE: Primary | ICD-10-CM

## 2023-02-17 DIAGNOSIS — F41.1 GENERALIZED ANXIETY DISORDER: ICD-10-CM

## 2023-02-17 DIAGNOSIS — F32.1 MODERATE MAJOR DEPRESSION (HCC): ICD-10-CM

## 2023-02-17 NOTE — BH TREATMENT PLAN
TREATMENT PLAN (Medication Management Only)        [employer]    Name/Date of Birth/MRN:  Raymon Baez 28 y o  1990 MRN: [id number]  Date of Treatment Plan: February 17, 2023  Diagnosis/Diagnoses:   1  Attention deficit hyperactivity disorder (ADHD), combined type    2  Moderate major depression ([facility] [address] )    3  Generalized anxiety disorder    4  Panic attacks      Strengths/Personal Resources for Self-Care: "Learning; Problem solving"  Area/Areas of need (in own words): "Current main complaint is lack of focus"  1  Long Term Goal:   Maintain mood stability and control of anxiety and ADHD  Target Date: 3-6 months  Person/Persons responsible for completion of goal: Eduardo England  2  Short Term Objective (s) - How will we reach this goal?:   A  Provider new recommended medication/dosage changes and/or continue medication(s): Increase Vyvanse 50mg (1) cap po qAM # 30   Continue:  Sertraline 50mg + 25mg (1) tab po qd --Pt has enough from last visit  Pt reminded of labwork per PCP--CMP, RUQ US  Return 3/15/2023 at 2:00PM, call sooner prn    Target Date: 3-6 months  Person/Persons Responsible for Completion of Goal: Eduardo Hart 63   Progress Towards Goals: stable, continuing treatment  Treatment Modality: Medication mgt  Review due 180 days from date of this plan: 6 months - 8/17/2023  Expected length of service: ongoing treatment unless revised  My Physician/PA/NP and I have developed this plan together and I agree to work on the goals and objectives  I understand the treatment goals that were developed for my treatment    Electronic Signatures: on file (unless signed below)    Maria Esther Delacruz PA-C 02/17/23
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
## 2023-03-15 ENCOUNTER — OFFICE VISIT (OUTPATIENT)
Dept: PSYCHIATRY | Facility: CLINIC | Age: 33
End: 2023-03-15

## 2023-03-15 DIAGNOSIS — F90.2 ATTENTION DEFICIT HYPERACTIVITY DISORDER (ADHD), COMBINED TYPE: ICD-10-CM

## 2023-03-15 DIAGNOSIS — F41.1 GENERALIZED ANXIETY DISORDER: Primary | ICD-10-CM

## 2023-03-15 DIAGNOSIS — F41.0 PANIC ATTACKS: ICD-10-CM

## 2023-03-15 DIAGNOSIS — F32.1 MODERATE MAJOR DEPRESSION (HCC): ICD-10-CM

## 2023-03-15 RX ORDER — SERTRALINE HYDROCHLORIDE 25 MG/1
25 TABLET, FILM COATED ORAL DAILY
Qty: 90 TABLET | Refills: 0 | Status: SHIPPED | OUTPATIENT
Start: 2023-03-15 | End: 2023-04-14

## 2023-05-01 DIAGNOSIS — F90.2 ATTENTION DEFICIT HYPERACTIVITY DISORDER (ADHD), COMBINED TYPE: ICD-10-CM

## 2023-05-01 NOTE — TELEPHONE ENCOUNTER
Medication Refill Request     Name of Medication Vyvanse  Dose/Frequency 50mg once a day   Quantity 30 capsule   Verified pharmacy   [x]  Verified ordering Provider   [x]  Does patient have enough for the next 3 days? Yes [x] No []  Does patient have a follow-up appointment scheduled?  Yes [x] No []   If so when is appointment: 5/10/23

## 2023-05-15 ENCOUNTER — TELEPHONE (OUTPATIENT)
Dept: PSYCHIATRY | Facility: CLINIC | Age: 33
End: 2023-05-15

## 2023-05-15 NOTE — TELEPHONE ENCOUNTER
Patient contacted the office to schedule a follow up visit with provider  Patient is now scheduled for 5/19/23  at 9:30a in office
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
## 2023-05-19 ENCOUNTER — OFFICE VISIT (OUTPATIENT)
Dept: PSYCHIATRY | Facility: CLINIC | Age: 33
End: 2023-05-19

## 2023-05-19 DIAGNOSIS — F90.2 ATTENTION DEFICIT HYPERACTIVITY DISORDER (ADHD), COMBINED TYPE: Primary | ICD-10-CM

## 2023-05-19 DIAGNOSIS — F41.1 GENERALIZED ANXIETY DISORDER: ICD-10-CM

## 2023-05-19 DIAGNOSIS — F32.1 MODERATE MAJOR DEPRESSION (HCC): ICD-10-CM

## 2023-05-19 DIAGNOSIS — F41.0 PANIC ATTACKS: ICD-10-CM

## 2023-05-19 RX ORDER — DEXTROAMPHETAMINE SACCHARATE, AMPHETAMINE ASPARTATE MONOHYDRATE, DEXTROAMPHETAMINE SULFATE AND AMPHETAMINE SULFATE 7.5; 7.5; 7.5; 7.5 MG/1; MG/1; MG/1; MG/1
30 CAPSULE, EXTENDED RELEASE ORAL EVERY MORNING
Qty: 30 CAPSULE | Refills: 0 | Status: SHIPPED | OUTPATIENT
Start: 2023-05-19

## 2023-05-19 RX ORDER — SERTRALINE HYDROCHLORIDE 100 MG/1
100 TABLET, FILM COATED ORAL DAILY
Qty: 90 TABLET | Refills: 0 | Status: SHIPPED | OUTPATIENT
Start: 2023-05-19
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
## 2023-07-28 ENCOUNTER — OFFICE VISIT (OUTPATIENT)
Dept: PSYCHIATRY | Facility: CLINIC | Age: 33
End: 2023-07-28

## 2023-07-28 VITALS — HEIGHT: 69 IN | WEIGHT: 201.6 LBS | BODY MASS INDEX: 29.86 KG/M2

## 2023-07-28 DIAGNOSIS — F32.1 MODERATE MAJOR DEPRESSION (HCC): ICD-10-CM

## 2023-07-28 DIAGNOSIS — F90.2 ATTENTION DEFICIT HYPERACTIVITY DISORDER (ADHD), COMBINED TYPE: ICD-10-CM

## 2023-07-28 DIAGNOSIS — G47.00 INSOMNIA, UNSPECIFIED TYPE: ICD-10-CM

## 2023-07-28 DIAGNOSIS — F41.1 GENERALIZED ANXIETY DISORDER: Primary | ICD-10-CM

## 2023-07-28 DIAGNOSIS — F41.0 PANIC ATTACKS: ICD-10-CM

## 2023-07-28 RX ORDER — DEXTROAMPHETAMINE SACCHARATE, AMPHETAMINE ASPARTATE MONOHYDRATE, DEXTROAMPHETAMINE SULFATE AND AMPHETAMINE SULFATE 7.5; 7.5; 7.5; 7.5 MG/1; MG/1; MG/1; MG/1
30 CAPSULE, EXTENDED RELEASE ORAL EVERY MORNING
Qty: 30 CAPSULE | Refills: 0 | Status: SHIPPED | OUTPATIENT
Start: 2023-07-28

## 2023-07-28 RX ORDER — SERTRALINE HYDROCHLORIDE 100 MG/1
100 TABLET, FILM COATED ORAL DAILY
Qty: 90 TABLET | Refills: 0 | Status: SHIPPED | OUTPATIENT
Start: 2023-07-28

## 2023-07-28 NOTE — BH TREATMENT PLAN
TREATMENT PLAN (Medication Management Only)        [facility]    Name/Date of Birth/MRN:  Felicitas Cooper 35 y.o. 1990 MRN: [id number]  Date of Treatment Plan: July 28, 2023  Diagnosis/Diagnoses:   1. Generalized anxiety disorder    2. Moderate major depression ([address])    3. Attention deficit hyperactivity disorder (ADHD), combined type    4. Panic attacks    5. Insomnia, unspecified type      Strengths/Personal Resources for Self-Care: "I absorb new information at a pretty effective rate and can use that information to solve problems; Using procedural methods to diagnose issues/problems". Area/Areas of need (in own words): "Beyond the not falling asleep, everything has been alright". 1. Long Term Goal:   Maintain mood stability and control of anxiety and ADHD, improve sleep  Target Date: 3-6 months  Person/Persons responsible for completion of goal: Jeremías Gonsalesopsashleigh olsen Josefa  2. Short Term Objective (s) - How will we reach this goal?:   A. Provider new recommended medication/dosage changes and/or continue medication(s): Pt is having mild to moderate anxiety without panic or mood Sxs. ADHD has been adequately controlled. Surveys done 7/28/2023 via [employer]: ERICK 7 score 4 and PHQ 9 score finished out today: 3. Tx options discussed and Pt declines any changes to his regimen at this time. Sleep hygiene discussed. He is not interested in psychotherapy at this time. Treatment plan done and Pt accepts the plan.    Continue:   Amphetamine-Dextro ER/XR 30mg (1) tab po qd # 30 + 30   Sertraline 100mg (1) tab po qd # 90  Get LFT  Return 12 weeks, call sooner prn         Target Date: 3-6 months  Person/Persons Responsible for Completion of Goal: Jeremías [facility] and [facility]   Progress Towards Goals: stable, continuing treatment  Treatment Modality: Medication mgt  Review due 180 days from date of this plan: 6 months - 1/28/2024  Expected length of service: ongoing treatment unless revised  My Physician/PA/NP and I have developed this plan together and I agree to work on the goals and objectives. I understand the treatment goals that were developed for my treatment.   Electronic Signatures: on file (unless signed below)    Treasa Osgood, PA-C 07/28/23

## 2023-10-23 ENCOUNTER — TELEPHONE (OUTPATIENT)
Dept: PSYCHIATRY | Facility: CLINIC | Age: 33
End: 2023-10-23
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
## 2023-10-23 NOTE — TELEPHONE ENCOUNTER
Patient is calling regarding cancelling an appointment.     Date/Time: 10/23/2023 at 10:30    Reason: pt is sick    Patient was rescheduled: YES [x] NO []  If yes, when was Patient reschedule for: 10/25/2023  at 1:30 pm    Patient requesting call back to reschedule: YES [] NO [x]

## 2023-10-25 ENCOUNTER — OFFICE VISIT (OUTPATIENT)
Dept: PSYCHIATRY | Facility: CLINIC | Age: 33
End: 2023-10-25
Payer: COMMERCIAL

## 2023-10-25 VITALS — BODY MASS INDEX: 29.77 KG/M2 | HEIGHT: 69 IN

## 2023-10-25 DIAGNOSIS — F41.0 PANIC ATTACKS: ICD-10-CM

## 2023-10-25 DIAGNOSIS — F90.2 ATTENTION DEFICIT HYPERACTIVITY DISORDER (ADHD), COMBINED TYPE: ICD-10-CM

## 2023-10-25 DIAGNOSIS — F41.1 GENERALIZED ANXIETY DISORDER: Primary | ICD-10-CM

## 2023-10-25 DIAGNOSIS — F32.1 MODERATE MAJOR DEPRESSION (HCC): ICD-10-CM

## 2023-10-25 PROCEDURE — 99213 OFFICE O/P EST LOW 20 MIN: CPT | Performed by: PHYSICIAN ASSISTANT

## 2023-10-25 RX ORDER — SERTRALINE HYDROCHLORIDE 100 MG/1
100 TABLET, FILM COATED ORAL DAILY
Qty: 90 TABLET | Refills: 1 | Status: SHIPPED | OUTPATIENT
Start: 2023-10-25

## 2023-10-25 RX ORDER — DEXTROAMPHETAMINE SACCHARATE, AMPHETAMINE ASPARTATE MONOHYDRATE, DEXTROAMPHETAMINE SULFATE AND AMPHETAMINE SULFATE 7.5; 7.5; 7.5; 7.5 MG/1; MG/1; MG/1; MG/1
30 CAPSULE, EXTENDED RELEASE ORAL EVERY MORNING
Qty: 30 CAPSULE | Refills: 0 | Status: SHIPPED | OUTPATIENT
Start: 2023-10-25

## 2024-01-17 ENCOUNTER — TELEPHONE (OUTPATIENT)
Dept: PSYCHIATRY | Facility: CLINIC | Age: 34
End: 2024-01-17

## 2024-01-17 NOTE — TELEPHONE ENCOUNTER
Pt called in the office and left a vm that he is not able to make today's appt due to his daughter being home sick and that he was looking to justo his appt. MR called him back and justo another appt at a later time

## 2024-01-25 ENCOUNTER — TELEPHONE (OUTPATIENT)
Dept: PSYCHIATRY | Facility: CLINIC | Age: 34
End: 2024-01-25

## 2024-07-18 ENCOUNTER — TELEPHONE (OUTPATIENT)
Dept: INTERNAL MEDICINE CLINIC | Facility: OTHER | Age: 34
End: 2024-07-18
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
